# Patient Record
Sex: MALE | Race: WHITE | NOT HISPANIC OR LATINO | URBAN - METROPOLITAN AREA
[De-identification: names, ages, dates, MRNs, and addresses within clinical notes are randomized per-mention and may not be internally consistent; named-entity substitution may affect disease eponyms.]

---

## 2021-03-19 ENCOUNTER — IMPORTED ENCOUNTER (OUTPATIENT)
Dept: URBAN - METROPOLITAN AREA CLINIC 9 | Facility: CLINIC | Age: 61
End: 2021-03-19

## 2021-03-22 ENCOUNTER — IMPORTED ENCOUNTER (OUTPATIENT)
Dept: URBAN - METROPOLITAN AREA CLINIC 9 | Facility: CLINIC | Age: 61
End: 2021-03-22

## 2021-03-26 ENCOUNTER — IMPORTED ENCOUNTER (OUTPATIENT)
Dept: URBAN - METROPOLITAN AREA CLINIC 9 | Facility: CLINIC | Age: 61
End: 2021-03-26

## 2021-04-02 ENCOUNTER — IMPORTED ENCOUNTER (OUTPATIENT)
Dept: URBAN - METROPOLITAN AREA CLINIC 9 | Facility: CLINIC | Age: 61
End: 2021-04-02

## 2021-04-23 ENCOUNTER — IMPORTED ENCOUNTER (OUTPATIENT)
Dept: URBAN - METROPOLITAN AREA CLINIC 9 | Facility: CLINIC | Age: 61
End: 2021-04-23

## 2021-10-18 ASSESSMENT — VISUAL ACUITY
OS_PH: 20/25 -2 SN
OD_SC: 20/25 SN
OS_SC: 20/200 SN
OD_SC: 20/40 - SN
OS_SC: 20/200 - SN
OS_SC: 20/400 SN
OS_SC: 20/60 - SN
OD_SC: 20/25 + SN
OD_SC: 20/40 SN
OD_SC: 20/30 - SN

## 2021-10-18 ASSESSMENT — TONOMETRY
OD_IOP_MMHG: 14
OD_IOP_MMHG: 14
OS_IOP_MMHG: 13
OS_IOP_MMHG: 16
OD_IOP_MMHG: 11
OS_IOP_MMHG: 15
OD_IOP_MMHG: 15
OD_IOP_MMHG: 14

## 2021-11-17 NOTE — PATIENT DISCUSSION
Glasses Rx given.  advised  progressives, successful one day disposable contact lens wear. OS dominant. In the past did not prefer partial monovision. Retry partial monovision PRN. Recheck in one year. Explained the importance and need to dilate pupils . Patient refused today.

## 2021-12-16 ENCOUNTER — PREPPED CHART (OUTPATIENT)
Dept: URBAN - METROPOLITAN AREA CLINIC 17 | Facility: CLINIC | Age: 61
End: 2021-12-16

## 2021-12-17 ENCOUNTER — ESTABLISHED PATIENT (OUTPATIENT)
Dept: URBAN - METROPOLITAN AREA CLINIC 17 | Facility: CLINIC | Age: 61
End: 2021-12-17

## 2021-12-17 DIAGNOSIS — H43.813: ICD-10-CM

## 2021-12-17 DIAGNOSIS — H33.011: ICD-10-CM

## 2021-12-17 DIAGNOSIS — H35.413: ICD-10-CM

## 2021-12-17 DIAGNOSIS — H35.372: ICD-10-CM

## 2021-12-17 PROCEDURE — 92014 COMPRE OPH EXAM EST PT 1/>: CPT

## 2021-12-17 PROCEDURE — 92134 CPTRZ OPH DX IMG PST SGM RTA: CPT

## 2021-12-17 ASSESSMENT — VISUAL ACUITY
OD_CC: 20/25-1
OS_CC: 20/25-2

## 2021-12-17 ASSESSMENT — TONOMETRY
OD_IOP_MMHG: 14
OS_IOP_MMHG: 15

## 2022-04-20 NOTE — PATIENT DISCUSSION
Antibiotic /besivance eyedrops every hour while awake and every 2 hours in the evenings. Dilated OD with 1%t and 2.5% p follow-up in 24 hours.

## 2022-04-21 NOTE — PATIENT DISCUSSION
Significant improvement OD, advised to use antibiotic eyedrops every 2 hours while awake and return for office call on Monday. Call ASAP if any increase in pain, increase in redness, reduction in vision, or any problem.

## 2022-04-26 NOTE — PATIENT DISCUSSION
resolved with  trace Nonstaining infiltrate Inferior.  Okay to resume contact lens if desires on Monday, advised to cut back on wearing time OU Less than 12 hours daily, replace daily, remove contacts if any redness or pain. Stop besivance eyedrops.

## 2023-01-27 ENCOUNTER — ESTABLISHED PATIENT (OUTPATIENT)
Dept: URBAN - METROPOLITAN AREA CLINIC 17 | Facility: CLINIC | Age: 63
End: 2023-01-27

## 2023-01-27 DIAGNOSIS — H43.813: ICD-10-CM

## 2023-01-27 DIAGNOSIS — H33.011: ICD-10-CM

## 2023-01-27 DIAGNOSIS — H35.413: ICD-10-CM

## 2023-01-27 DIAGNOSIS — H35.373: ICD-10-CM

## 2023-01-27 PROCEDURE — 92134 CPTRZ OPH DX IMG PST SGM RTA: CPT

## 2023-01-27 PROCEDURE — 92014 COMPRE OPH EXAM EST PT 1/>: CPT

## 2023-01-27 ASSESSMENT — TONOMETRY
OS_IOP_MMHG: 23
OD_IOP_MMHG: 24

## 2023-01-27 ASSESSMENT — VISUAL ACUITY
OD_SC: 20/30
OS_CC: J1

## 2024-12-20 ENCOUNTER — COMPREHENSIVE EXAM (OUTPATIENT)
Age: 64
End: 2024-12-20

## 2024-12-20 DIAGNOSIS — H25.12: ICD-10-CM

## 2024-12-20 PROCEDURE — 92136 OPHTHALMIC BIOMETRY: CPT

## 2024-12-20 PROCEDURE — 92014 COMPRE OPH EXAM EST PT 1/>: CPT

## 2025-01-03 ENCOUNTER — PRE-OP/H&P (OUTPATIENT)
Age: 65
End: 2025-01-03

## 2025-01-03 DIAGNOSIS — H25.12: ICD-10-CM

## 2025-01-03 PROCEDURE — 99199ADVT ADVANCED VISION TESTING: Mod: NC

## 2025-01-09 ENCOUNTER — COMPREHENSIVE EXAM (OUTPATIENT)
Age: 65
End: 2025-01-09

## 2025-01-09 DIAGNOSIS — H43.813: ICD-10-CM

## 2025-01-09 DIAGNOSIS — H35.373: ICD-10-CM

## 2025-01-09 DIAGNOSIS — H25.12: ICD-10-CM

## 2025-01-09 PROCEDURE — 92201 OPSCPY EXTND RTA DRAW UNI/BI: CPT

## 2025-01-09 PROCEDURE — 92014 COMPRE OPH EXAM EST PT 1/>: CPT

## 2025-01-09 PROCEDURE — 92134 CPTRZ OPH DX IMG PST SGM RTA: CPT

## 2025-02-12 ENCOUNTER — SURGERY/PROCEDURE (OUTPATIENT)
Age: 65
End: 2025-02-12

## 2025-02-12 DIAGNOSIS — H25.12: ICD-10-CM

## 2025-02-12 PROBLEM — Z98.890: Noted: 2025-02-12

## 2025-02-12 PROCEDURE — 99199PPLAL SPECIAL SERVICE OR REPORT INSERT LAL LENS

## 2025-02-12 PROCEDURE — 66984LAL REMOVE CATARACT; INSERT LAL LENS

## 2025-02-13 ENCOUNTER — POST-OP (OUTPATIENT)
Age: 65
End: 2025-02-13

## 2025-02-13 DIAGNOSIS — Z98.890: ICD-10-CM

## 2025-02-13 DIAGNOSIS — Z96.1: ICD-10-CM

## 2025-02-13 PROCEDURE — P6698455 NON-COMANAGED ADVANCED PO

## 2025-03-03 ENCOUNTER — POST-OP (OUTPATIENT)
Age: 65
End: 2025-03-03

## 2025-03-03 DIAGNOSIS — Z98.890: ICD-10-CM

## 2025-03-03 PROCEDURE — 66999LALA LAL ADJUSTMENT: Mod: NC,LT

## 2025-03-06 ENCOUNTER — POST-OP (OUTPATIENT)
Age: 65
End: 2025-03-06

## 2025-03-06 DIAGNOSIS — Z96.1: ICD-10-CM

## 2025-03-06 PROCEDURE — 66999LALA LAL ADJUSTMENT: Mod: NC,LT

## 2025-03-10 ENCOUNTER — POST-OP (OUTPATIENT)
Age: 65
End: 2025-03-10

## 2025-03-10 DIAGNOSIS — Z96.1: ICD-10-CM

## 2025-03-10 PROCEDURE — 66999LALA LAL ADJUSTMENT: Mod: NC,LT

## 2025-03-11 ENCOUNTER — EMERGENCY VISIT (OUTPATIENT)
Age: 65
End: 2025-03-11

## 2025-03-11 DIAGNOSIS — Z96.1: ICD-10-CM

## 2025-03-11 PROCEDURE — P6698455 NON-COMANAGED ADVANCED PO
